# Patient Record
Sex: FEMALE | Race: WHITE | URBAN - METROPOLITAN AREA
[De-identification: names, ages, dates, MRNs, and addresses within clinical notes are randomized per-mention and may not be internally consistent; named-entity substitution may affect disease eponyms.]

---

## 2023-07-25 ENCOUNTER — WEB ENCOUNTER (OUTPATIENT)
Dept: URBAN - METROPOLITAN AREA CLINIC 74 | Facility: CLINIC | Age: 68
End: 2023-07-25

## 2023-07-31 ENCOUNTER — LAB OUTSIDE AN ENCOUNTER (OUTPATIENT)
Dept: URBAN - METROPOLITAN AREA CLINIC 74 | Facility: CLINIC | Age: 68
End: 2023-07-31

## 2023-07-31 ENCOUNTER — OFFICE VISIT (OUTPATIENT)
Dept: URBAN - METROPOLITAN AREA CLINIC 74 | Facility: CLINIC | Age: 68
End: 2023-07-31
Payer: MEDICARE

## 2023-07-31 VITALS
TEMPERATURE: 97.2 F | BODY MASS INDEX: 53.7 KG/M2 | WEIGHT: 255.8 LBS | HEART RATE: 84 BPM | SYSTOLIC BLOOD PRESSURE: 152 MMHG | HEIGHT: 58 IN | DIASTOLIC BLOOD PRESSURE: 80 MMHG

## 2023-07-31 DIAGNOSIS — E66.01 MORBID (SEVERE) OBESITY DUE TO EXCESS CALORIES: ICD-10-CM

## 2023-07-31 DIAGNOSIS — R19.7 BLOODY DIARRHEA: ICD-10-CM

## 2023-07-31 DIAGNOSIS — Q76.1 SHORT NECK SYNDROME: ICD-10-CM

## 2023-07-31 DIAGNOSIS — K92.1 HEMATOCHEZIA: ICD-10-CM

## 2023-07-31 DIAGNOSIS — R10.9 ABDOMINAL CRAMPING: ICD-10-CM

## 2023-07-31 PROBLEM — 83911000119104: Status: ACTIVE | Noted: 2023-07-31

## 2023-07-31 PROBLEM — 408512008: Status: ACTIVE | Noted: 2023-07-31

## 2023-07-31 PROBLEM — 95427009: Status: ACTIVE | Noted: 2023-07-31

## 2023-07-31 PROCEDURE — 99203 OFFICE O/P NEW LOW 30 MIN: CPT | Performed by: PHYSICIAN ASSISTANT

## 2023-07-31 RX ORDER — ASPIRIN 81 MG/1
1 TABLET TABLET, COATED ORAL ONCE A DAY
Status: ACTIVE | COMMUNITY

## 2023-07-31 RX ORDER — ORAL SEMAGLUTIDE 3 MG/1
AS DIRECTED TABLET ORAL
Status: ACTIVE | COMMUNITY

## 2023-07-31 RX ORDER — POLYETHYLENE GLYCOL 3350 17 G/17G
AS DIRECTED POWDER, FOR SOLUTION ORAL
Qty: 238 G | Refills: 0 | OUTPATIENT
Start: 2023-07-31 | End: 2023-08-01

## 2023-07-31 RX ORDER — LISINOPRIL AND HYDROCHLOROTHIAZIDE 10; 12.5 MG/1; MG/1
1 TABLET TABLET ORAL ONCE A DAY
Status: ACTIVE | COMMUNITY

## 2023-07-31 RX ORDER — FLUTICASONE PROPIONATE AND SALMETEROL 50; 500 UG/1; UG/1
AS DIRECTED POWDER RESPIRATORY (INHALATION)
Status: ACTIVE | COMMUNITY

## 2023-07-31 NOTE — HPI-TODAY'S VISIT:
The patient is 68-year-old female with past medical history as noted below is presenting to our clinic today for evaluation of intestinal infection. Last Colonoscopy at the age of 52 year old. No colon polyps. She has a long history of diarrhea. No previous stool study. She was just treated with Flagyl 500 mg for total of 14 days. She sttaes taht she stopped her Metformin and her diarrhea improved but she continues to be symptoms 7-8 times watery stools per day. Occasional bloody diarrhea. Bloody diarrhea for the past two weeks. She currently on Rybelsus for DM. She is complaining of abdominal cramping, bloody stools, and excessive gas. Recent labs at PCP unremarkable. No recent stool study or imaging.    -- The patient denies dyspepsia, dysphagia, odynophagia, hemoptysis, hematemesis, nausea, vomiting, regurgitation, melena, constipation, abdominal pain, hematochezia, fever, chills, chest pain, SOB, or any other GI complaints today.  -- The patient denies ETOH, Tobacco, and Illicit drug use.  -- The patient is up to date with Flu, Pneumonia, Shingles, and COVID vaccine 3/3. -- Denies NSAID's.

## 2023-07-31 NOTE — PHYSICAL EXAM GASTROINTESTINAL
Abdomen , soft, nontender, nondistended , no guarding or rigidity , no masses palpable , normal bowel sounds , Liver and Spleen , no hepatomegaly present , no hepatosplenomegaly , liver nontender , spleen not palpable, ventral hernia Repatha follow-up. Confirmed 2 patient identifiers - name and . Start date confirmed -18. No side effects or missed doses reported. Dry mouth mentioned, but she does not think it's the Repatha causing it. She was advised to monitor and let me know if it worsens. Patient confirms dosing, no difficulties with administration. She did not hear from the Repatha Ready nurse, so she just did it herself without any issues. He/she confirms no changes to insurance or health and has no further questions at this time.  All questions answered and addressed to patients satisfaction. [facility] will continue to reach out to patient monthly to arrange refills.

## 2023-08-01 PROBLEM — 95545007: Status: ACTIVE | Noted: 2023-08-01

## 2023-08-01 PROBLEM — 449341000124102: Status: ACTIVE | Noted: 2023-08-01

## 2023-08-01 PROBLEM — 247330004: Status: ACTIVE | Noted: 2023-08-01

## 2023-08-02 ENCOUNTER — WEB ENCOUNTER (OUTPATIENT)
Dept: URBAN - METROPOLITAN AREA CLINIC 74 | Facility: CLINIC | Age: 68
End: 2023-08-02

## 2023-08-08 LAB
ADENOVIRUS F 40/41: NOT DETECTED
CALPROTECTIN, STOOL - QDX: (no result)
CAMPYLOBACTER: NOT DETECTED
CLOSTRIDIUM DIFFICILE: NOT DETECTED
ENTAMOEBA HISTOLYTICA: NOT DETECTED
ENTEROAGGREGATIVE E.COLI: NOT DETECTED
ENTEROTOXIGENIC E.COLI: NOT DETECTED
ESCHERICHIA COLI O157: NOT DETECTED
FECAL FAT, QUALITATIVE: (no result)
GIARDIA LAMBLIA: NOT DETECTED
H. PYLORI (EIA) - QDX: NEGATIVE
NOROVIRUS GI/GII: NOT DETECTED
PANCREATICELASTASE ELISA, STOOL: (no result)
ROTAVIRUS A: NOT DETECTED
SALMONELLA SPP.: NOT DETECTED
SHIGA-LIKE TOXIN PRODUCING E.COLI: NOT DETECTED
SHIGELLA SPP. / ENTEROINVASIVE E.COLI: NOT DETECTED
STOOL, WHITE BLOOD CELLS: (no result)
VIBRIO PARAHAEMOLYTICUS: NOT DETECTED
VIBRIO SPP.: NOT DETECTED
YERSINIA ENTEROCOLITICA: NOT DETECTED

## 2023-08-09 ENCOUNTER — WEB ENCOUNTER (OUTPATIENT)
Dept: URBAN - METROPOLITAN AREA CLINIC 74 | Facility: CLINIC | Age: 68
End: 2023-08-09

## 2023-08-18 ENCOUNTER — TELEPHONE ENCOUNTER (OUTPATIENT)
Dept: URBAN - METROPOLITAN AREA CLINIC 74 | Facility: CLINIC | Age: 68
End: 2023-08-18

## 2023-08-24 ENCOUNTER — OFFICE VISIT (OUTPATIENT)
Dept: URBAN - METROPOLITAN AREA SURGERY CENTER 30 | Facility: SURGERY CENTER | Age: 68
End: 2023-08-24

## 2023-09-12 ENCOUNTER — TELEPHONE ENCOUNTER (OUTPATIENT)
Dept: URBAN - METROPOLITAN AREA CLINIC 74 | Facility: CLINIC | Age: 68
End: 2023-09-12

## 2023-09-13 ENCOUNTER — OFFICE VISIT (OUTPATIENT)
Dept: URBAN - METROPOLITAN AREA MEDICAL CENTER 30 | Facility: MEDICAL CENTER | Age: 68
End: 2023-09-13
Payer: MEDICARE

## 2023-09-13 DIAGNOSIS — K63.89 OTHER SPECIFIED DISEASES OF INTESTINE: ICD-10-CM

## 2023-09-13 DIAGNOSIS — K52.3 COLITIS, INDETERMINATE: ICD-10-CM

## 2023-09-13 PROCEDURE — 45380 COLONOSCOPY AND BIOPSY: CPT | Performed by: INTERNAL MEDICINE

## 2023-09-14 ENCOUNTER — TELEPHONE ENCOUNTER (OUTPATIENT)
Dept: URBAN - METROPOLITAN AREA CLINIC 3 | Facility: CLINIC | Age: 68
End: 2023-09-14

## 2023-09-15 ENCOUNTER — OFFICE VISIT (OUTPATIENT)
Dept: URBAN - METROPOLITAN AREA CLINIC 74 | Facility: CLINIC | Age: 68
End: 2023-09-15

## 2023-09-15 ENCOUNTER — WEB ENCOUNTER (OUTPATIENT)
Dept: URBAN - METROPOLITAN AREA CLINIC 74 | Facility: CLINIC | Age: 68
End: 2023-09-15

## 2023-09-15 RX ORDER — ASPIRIN 81 MG/1
1 TABLET TABLET, COATED ORAL ONCE A DAY
Status: ACTIVE | COMMUNITY

## 2023-09-15 RX ORDER — LISINOPRIL AND HYDROCHLOROTHIAZIDE 10; 12.5 MG/1; MG/1
1 TABLET TABLET ORAL ONCE A DAY
Status: ACTIVE | COMMUNITY

## 2023-09-15 RX ORDER — FLUTICASONE PROPIONATE AND SALMETEROL 50; 500 UG/1; UG/1
AS DIRECTED POWDER RESPIRATORY (INHALATION)
Status: ACTIVE | COMMUNITY

## 2023-09-15 RX ORDER — ORAL SEMAGLUTIDE 3 MG/1
AS DIRECTED TABLET ORAL
Status: ACTIVE | COMMUNITY

## 2023-09-15 NOTE — HPI-TODAY'S VISIT:
The patient is 68-year-old female with past medical history as noted below is presenting to our clinic today for follow up appointment. Labs, stool study, and Colonoscopy were ordered last visit but the patient did not complete labs and Colonoscopy. Stool study was normal.    -- The patient denies dyspepsia, dysphagia, odynophagia, hemoptysis, hematemesis, nausea, vomiting, regurgitation, melena, constipation, abdominal pain, hematochezia, fever, chills, chest pain, SOB, or any other GI complaints today.  -- The patient denies ETOH, Tobacco, and Illicit drug use.  -- The patient is up to date with Flu, Pneumonia, Shingles, and COVID vaccine 3/3. -- Denies NSAID's.  Diagnostic studies: -- Stool study on 07/31/2023 GPP, Pancreatic elastase, Fecal fat, WBC's, H. pylori stool antigen, and Calprotectin are normal.

## 2023-09-17 ENCOUNTER — WEB ENCOUNTER (OUTPATIENT)
Dept: URBAN - METROPOLITAN AREA CLINIC 74 | Facility: CLINIC | Age: 68
End: 2023-09-17

## 2023-09-18 ENCOUNTER — TELEPHONE ENCOUNTER (OUTPATIENT)
Dept: URBAN - METROPOLITAN AREA CLINIC 74 | Facility: CLINIC | Age: 68
End: 2023-09-18

## 2023-09-18 PROBLEM — 41364008: Status: ACTIVE | Noted: 2023-09-18

## 2023-09-21 ENCOUNTER — OFFICE VISIT (OUTPATIENT)
Dept: URBAN - METROPOLITAN AREA CLINIC 74 | Facility: CLINIC | Age: 68
End: 2023-09-21

## 2023-09-27 PROBLEM — 64766004: Status: ACTIVE | Noted: 2023-09-27

## 2023-09-28 ENCOUNTER — OFFICE VISIT (OUTPATIENT)
Dept: URBAN - METROPOLITAN AREA CLINIC 74 | Facility: CLINIC | Age: 68
End: 2023-09-28
Payer: MEDICARE

## 2023-09-28 VITALS
TEMPERATURE: 97.3 F | BODY MASS INDEX: 53.02 KG/M2 | WEIGHT: 252.6 LBS | DIASTOLIC BLOOD PRESSURE: 80 MMHG | HEIGHT: 58 IN | HEART RATE: 74 BPM | SYSTOLIC BLOOD PRESSURE: 120 MMHG

## 2023-09-28 DIAGNOSIS — K51.20 ULCERATIVE COLITIS CONFINED TO RECTUM: ICD-10-CM

## 2023-09-28 PROCEDURE — 99213 OFFICE O/P EST LOW 20 MIN: CPT | Performed by: PHYSICIAN ASSISTANT

## 2023-09-28 RX ORDER — ORAL SEMAGLUTIDE 3 MG/1
AS DIRECTED TABLET ORAL
Status: ACTIVE | COMMUNITY

## 2023-09-28 RX ORDER — FLUTICASONE PROPIONATE AND SALMETEROL 50; 500 UG/1; UG/1
AS DIRECTED POWDER RESPIRATORY (INHALATION)
Status: ACTIVE | COMMUNITY

## 2023-09-28 RX ORDER — ASPIRIN 81 MG/1
1 TABLET TABLET, COATED ORAL ONCE A DAY
Status: ON HOLD | COMMUNITY

## 2023-09-28 RX ORDER — LISINOPRIL AND HYDROCHLOROTHIAZIDE 10; 12.5 MG/1; MG/1
1 TABLET TABLET ORAL ONCE A DAY
Status: ON HOLD | COMMUNITY

## 2023-09-28 NOTE — HPI-TODAY'S VISIT:
The patient is 68-year-old female with past medical history as noted below is presenting to our clinic today for follow up appointment. Labs, stool study, and Colonoscopy were ordered last visit but the patient did not complete labs and Colonoscopy. Stool study was normal. The patient was diagnosed with UC.  She was started on Mesalamine 1000 mg enema per rectum at bedtime and Mesalamine 1.2 g 4.8 g daily. She is doing much better and diarrhea has resolved. She has 1-2 bowel movements and no bleeding. She had completed labs at her PCP on Monday.   -- The patient denies dyspepsia, dysphagia, odynophagia, hemoptysis, hematemesis, nausea, vomiting, regurgitation, melena, constipation, abdominal pain, hematochezia, fever, chills, chest pain, SOB, or any other GI complaints today.  -- The patient denies ETOH, Tobacco, and Illicit drug use.  -- The patient is up to date with Flu, Pneumonia, Shingles, and COVID vaccine 3/3. -- Denies NSAID's.  Diagnostic studies: -- Stool study on 07/31/2023 GPP, Pancreatic elastase, Fecal fat, WBC's, H. pylori stool antigen, and Calprotectin are normal.  Procedure: -- Colonoscopy with biopsy on 09/13/2023 by Dr. Platt noted preparation of the colon was fair.  Diverticulosis in the ascending colon, in the descending colon, and in the sigmoid colon.  Erythematous mucosa in the rectum.  Findings are consistent with inflammatory bowel disease.  Inflammation was found from the rectum to the sigmoid colon.  This was severe.  Normal mucosa in the descending colon, at the splenic flexure, in the transverse colon, at the hepatic flexure, and in the ascending colon.  Biopsy of cecum, ascending colon, transverse colon, and descending colon consistent mostly with benign colonic mucosa. One fragment shows focal acute inflammation.  Otherwise intact crypt architecture.  Negative for dysplasia.  Biopsy of sigmoid and rectum with chronic active colitis.  Negative for dysplasia. The most significant changes are seen within the sigmoid and rectum biopsies. These show colonic mucosa with increased lamina propria inflammation consisting of lymphocytes, plasma cells, and eosinophils. There is a lesser component of neutrophils but is still present. Crypt architectural distortion is seen. This may represent inflammatory bowel disease.

## 2023-10-11 ENCOUNTER — OFFICE VISIT (OUTPATIENT)
Dept: URBAN - METROPOLITAN AREA CLINIC 74 | Facility: CLINIC | Age: 68
End: 2023-10-11

## 2023-10-11 RX ORDER — MESALAMINE 1.2 G/1
4 TABLETS WITH A MEAL TABLET, DELAYED RELEASE ORAL ONCE A DAY
Qty: 120 TABLET | Refills: 5 | OUTPATIENT
Start: 2023-09-18 | End: 2024-03-16

## 2023-10-11 RX ORDER — ASPIRIN 81 MG/1
1 TABLET TABLET, COATED ORAL ONCE A DAY
Status: ACTIVE | COMMUNITY

## 2023-10-11 RX ORDER — LISINOPRIL AND HYDROCHLOROTHIAZIDE 10; 12.5 MG/1; MG/1
1 TABLET TABLET ORAL ONCE A DAY
Status: ACTIVE | COMMUNITY

## 2023-10-11 RX ORDER — MESALAMINE 1000 MG/1
1 SUPPOSITORY AT BEDTIME SUPPOSITORY RECTAL ONCE A DAY
Qty: 30 | Refills: 1 | OUTPATIENT
Start: 2023-09-18 | End: 2023-11-16

## 2023-10-11 RX ORDER — ORAL SEMAGLUTIDE 3 MG/1
AS DIRECTED TABLET ORAL
Status: ACTIVE | COMMUNITY

## 2023-10-11 RX ORDER — FLUTICASONE PROPIONATE AND SALMETEROL 50; 500 UG/1; UG/1
AS DIRECTED POWDER RESPIRATORY (INHALATION)
Status: ACTIVE | COMMUNITY

## 2023-10-11 NOTE — HPI-TODAY'S VISIT:
The patient is 68-year-old female with past medical history as noted below known to Dr. Platt is presenting to our clinic to discuss her recent stool studies and Colonoscopy results. Her labs reviewed from PCP noted elevated LFT's. The patient Colonoscopy and biopsy were reviewed on 09/18/2023 with her and Dr. Platt. She was started on Mesalamine 1000 mf enema X 30 days and  Mesalamine 1.2 g 4 tablets daily.   -- The patient denies dyspepsia, dysphagia, odynophagia, hemoptysis, hematemesis, nausea, vomiting, regurgitation, melena, constipation, abdominal pain, hematochezia, fever, chills, chest pain, SOB, or any other GI complaints today.  -- The patient denies ETOH, Tobacco, and Illicit drug use.  -- The patient is up to date with Flu, Pneumonia, Shingles, and COVID vaccine 3/3. -- Denies NSAID's.  Diagnostic studies: -- Stool studies on 08/04/2023 GPP, WBC's, Fecal Fat, Pancreatic elastase, H. pylori, and Calprotectin are normal.  -- Labs on 0/27/2023 CMP with BUN 15, creatinine 1.63, , AST 37, ALT 42, and TB 0.6.  Hemoglobin A1c 6.2.  CBC with WBC 8.5, hemoglobin 14.0, hematocrit 42.5, and platelet 289.  .  Lipid panel with cholesterol 194, triglyceride 104, HDL 80, LDL 94.   Procedure: -- Colonoscopy on 09/13/2023 by Dr. Platt noted preparation of the colon was poor.  Diverticulosis in the descending colon.  Diverticulosis in the sigmoid colon.  Diverticulosis in the ascending colon.  Erythematous mucosa in the rectum.  Findings are consistent with inflammatory bowel disease.  Inflammation was found from the rectum to the sigmoid colon.  Normal mucosa in the descending colon, at the splenic flexure, in the transverse colon, at the hepatic flexure, and in the ascending colon.  Biopsy mostly benign colonic mucosa.  1 fragment shows focal active inflammation.  Otherwise intact crypt architecture.  Negative for dysplasia.  With sigmoid and rectum with chronic active colitis.  No dysplasia.  The most significant changes are seen within the sigmoid and rectum biopsies.  These show colonic mucosa with increased lamina propria inflammation consisting of lymphocytes, plasma cells, and eosinophils.  There is a lesser component of neutrophils but is still present.  Crypt architectural distortion is seen.  This may represent inflammatory bowel disease.

## 2023-11-10 PROBLEM — 197321007: Status: ACTIVE | Noted: 2023-11-10

## 2023-12-07 ENCOUNTER — CLAIMS CREATED FROM THE CLAIM WINDOW (OUTPATIENT)
Dept: URBAN - METROPOLITAN AREA CLINIC 74 | Facility: CLINIC | Age: 68
End: 2023-12-07
Payer: MEDICARE

## 2023-12-07 ENCOUNTER — LAB OUTSIDE AN ENCOUNTER (OUTPATIENT)
Dept: URBAN - METROPOLITAN AREA CLINIC 74 | Facility: CLINIC | Age: 68
End: 2023-12-07

## 2023-12-07 VITALS
BODY MASS INDEX: 52.31 KG/M2 | WEIGHT: 249.2 LBS | HEIGHT: 58 IN | HEART RATE: 77 BPM | TEMPERATURE: 96.9 F | OXYGEN SATURATION: 96 % | DIASTOLIC BLOOD PRESSURE: 84 MMHG | SYSTOLIC BLOOD PRESSURE: 146 MMHG

## 2023-12-07 DIAGNOSIS — R74.01 TRANSAMINITIS: ICD-10-CM

## 2023-12-07 DIAGNOSIS — K76.0 HEPATIC STEATOSIS: ICD-10-CM

## 2023-12-07 DIAGNOSIS — K51.30 ULCERATIVE RECTOSIGMOIDITIS WITHOUT COMPLICATION: ICD-10-CM

## 2023-12-07 PROCEDURE — 99214 OFFICE O/P EST MOD 30 MIN: CPT | Performed by: PHYSICIAN ASSISTANT

## 2023-12-07 RX ORDER — MESALAMINE 1.2 G/1
4 TABLETS WITH A MEAL TABLET, DELAYED RELEASE ORAL ONCE A DAY
Qty: 360 TABLET | Refills: 1
Start: 2023-09-18 | End: 2024-06-04

## 2023-12-07 RX ORDER — ASPIRIN 81 MG/1
1 TABLET TABLET, COATED ORAL ONCE A DAY
Status: ON HOLD | COMMUNITY

## 2023-12-07 RX ORDER — MESALAMINE 1000 MG/1
1 SUPPOSITORY AT BEDTIME SUPPOSITORY RECTAL
Qty: 90 | Refills: 1

## 2023-12-07 RX ORDER — LISINOPRIL AND HYDROCHLOROTHIAZIDE 10; 12.5 MG/1; MG/1
1 TABLET TABLET ORAL ONCE A DAY
Status: ACTIVE | COMMUNITY

## 2023-12-07 RX ORDER — FLUTICASONE PROPIONATE AND SALMETEROL 50; 500 UG/1; UG/1
AS DIRECTED POWDER RESPIRATORY (INHALATION)
Status: ACTIVE | COMMUNITY

## 2023-12-07 RX ORDER — MESALAMINE 1000 MG/1
1 SUPPOSITORY AT BEDTIME SUPPOSITORY RECTAL ONCE A DAY
Status: ACTIVE | COMMUNITY

## 2023-12-07 RX ORDER — ORAL SEMAGLUTIDE 3 MG/1
AS DIRECTED TABLET ORAL
Status: ACTIVE | COMMUNITY

## 2023-12-07 RX ORDER — MESALAMINE 1.2 G/1
4 TABLETS WITH A MEAL TABLET, DELAYED RELEASE ORAL ONCE A DAY
Qty: 120 TABLET | Refills: 5 | Status: ACTIVE | COMMUNITY
Start: 2023-09-18 | End: 2024-03-16

## 2023-12-07 NOTE — HPI-TODAY'S VISIT:
The patient is 68-year-old female with past medical history as noted below known to Dr. Platt is presenting to our clinic for follow up appointment.  She is taking on  Mesalamine 1000 mg enema X 30 days and  Mesalamine 1.2 g 4 tablets daily. She is doing much better with diarrhea and no more rectal bleeding. She is doing much better. No new GI issues today. She needs refill on medications. She is diagnosed with TENISHA and she is going to wear C-PAP.    -- The patient denies dyspepsia, dysphagia, odynophagia, hemoptysis, hematemesis, nausea, vomiting, regurgitation, melena, constipation, dairrhea, abdominal pain, hematochezia, fever, chills, chest pain, SOB, or any other GI complaints today.  -- The patient denies ETOH, Tobacco, and Illicit drug use.  -- The patient is up to date with Flu, Pneumonia, Shingles, and COVID vaccine. -- Denies NSAID's.  Diagnostic studies: -- Stool studies on 08/04/2023 GPP, WBC's, Fecal Fat, Pancreatic elastase, H. pylori, and Calprotectin are normal.  -- Labs on 04/27/2023 CMP with BUN 15, creatinine 1.63, , AST 37, ALT 42, and TB 0.6.  Hemoglobin A1c 6.2.  CBC with WBC 8.5, hemoglobin 14.0, hematocrit 42.5, and platelet 289.  .  Lipid panel with cholesterol 194, triglyceride 104, HDL 80, LDL 94.   Procedure: -- Colonoscopy on 09/13/2023 by Dr. Platt noted preparation of the colon was poor.  Diverticulosis in the descending colon.  Diverticulosis in the sigmoid colon.  Diverticulosis in the ascending colon.  Erythematous mucosa in the rectum.  Findings are consistent with inflammatory bowel disease.  Inflammation was found from the rectum to the sigmoid colon.  Normal mucosa in the descending colon, at the splenic flexure, in the transverse colon, at the hepatic flexure, and in the ascending colon.  Biopsy mostly benign colonic mucosa.  1 fragment shows focal active inflammation.  Otherwise intact crypt architecture.  Negative for dysplasia.  With sigmoid and rectum with chronic active colitis.  No dysplasia.  The most significant changes are seen within the sigmoid and rectum biopsies.  These show colonic mucosa with increased lamina propria inflammation consisting of lymphocytes, plasma cells, and eosinophils.  There is a lesser component of neutrophils but is still present.  Crypt architectural distortion is seen.  This may represent inflammatory bowel disease.

## 2023-12-15 ENCOUNTER — DASHBOARD ENCOUNTERS (OUTPATIENT)
Age: 68
End: 2023-12-15

## 2023-12-15 LAB
A/G RATIO: 1.3
ABSOLUTE BASOPHILS: 39
ABSOLUTE EOSINOPHILS: 225
ABSOLUTE LYMPHOCYTES: 2764
ABSOLUTE MONOCYTES: 725
ABSOLUTE NEUTROPHILS: 6047
ACTIN (SMOOTH MUSCLE) ANTIBODY (IGG): <20
ALBUMIN: 4
ALKALINE PHOSPHATASE: 99
ALPHA-1-ANTITRYPSIN QN: 138
ALT (SGPT): 20
ANA SCREEN, IFA: NEGATIVE
AST (SGOT): 24
BASOPHILS: 0.4
BILIRUBIN, TOTAL: 0.5
BUN/CREATININE RATIO: (no result)
BUN: 15
CALCIUM: 9.9
CARBON DIOXIDE, TOTAL: 25
CHLORIDE: 100
CREATININE: 0.67
EGFR: 96
EOSINOPHILS: 2.3
GGT: 26
GLOBULIN, TOTAL: 3
GLUCOSE: 104
HBSAG SCREEN: (no result)
HEMATOCRIT: 41
HEMOGLOBIN: 13.7
HEP A AB, IGM: (no result)
HEP B CORE AB, IGM: (no result)
HEPATITIS C ANTIBODY: (no result)
HEREDITARY HEMOCHROMATOSIS DNA MUT: (no result)
LYMPHOCYTES: 28.2
MCH: 30.5
MCHC: 33.4
MCV: 91.3
MITOCHONDRIAL (M2) ANTIBODY: <=20
MONOCYTES: 7.4
MPV: 11.1
NEUTROPHILS: 61.7
PLATELET COUNT: 375
POTASSIUM: 4.2
PROTEIN, TOTAL: 7
RDW: 13.1
RED BLOOD CELL COUNT: 4.49
RHEUMATOID FACTOR: <14
SJOGREN'S ANTIBODY (SS-A): (no result)
SJOGREN'S ANTIBODY (SS-B): (no result)
SODIUM: 136
WHITE BLOOD CELL COUNT: 9.8

## 2024-06-07 ENCOUNTER — OFFICE VISIT (OUTPATIENT)
Dept: URBAN - METROPOLITAN AREA CLINIC 74 | Facility: CLINIC | Age: 69
End: 2024-06-07
Payer: MEDICARE

## 2024-06-07 ENCOUNTER — LAB OUTSIDE AN ENCOUNTER (OUTPATIENT)
Dept: URBAN - METROPOLITAN AREA CLINIC 74 | Facility: CLINIC | Age: 69
End: 2024-06-07

## 2024-06-07 VITALS
SYSTOLIC BLOOD PRESSURE: 124 MMHG | DIASTOLIC BLOOD PRESSURE: 80 MMHG | BODY MASS INDEX: 51.59 KG/M2 | WEIGHT: 245.8 LBS | HEIGHT: 58 IN | HEART RATE: 89 BPM | TEMPERATURE: 98.2 F

## 2024-06-07 DIAGNOSIS — K76.0 HEPATIC STEATOSIS: ICD-10-CM

## 2024-06-07 DIAGNOSIS — K51.30 ULCERATIVE RECTOSIGMOIDITIS WITHOUT COMPLICATION: ICD-10-CM

## 2024-06-07 PROCEDURE — 99214 OFFICE O/P EST MOD 30 MIN: CPT | Performed by: PHYSICIAN ASSISTANT

## 2024-06-07 RX ORDER — LISINOPRIL AND HYDROCHLOROTHIAZIDE 10; 12.5 MG/1; MG/1
1 TABLET TABLET ORAL ONCE A DAY
Status: ACTIVE | COMMUNITY

## 2024-06-07 RX ORDER — MESALAMINE 1000 MG/1
1 SUPPOSITORY AT BEDTIME SUPPOSITORY RECTAL
Qty: 90 | Refills: 1 | Status: ACTIVE | COMMUNITY

## 2024-06-07 RX ORDER — ORAL SEMAGLUTIDE 3 MG/1
AS DIRECTED TABLET ORAL
Status: ACTIVE | COMMUNITY

## 2024-06-07 RX ORDER — MESALAMINE 1000 MG/1
1 SUPPOSITORY AT BEDTIME SUPPOSITORY RECTAL
Qty: 90 | Refills: 1
End: 2024-12-04

## 2024-06-07 RX ORDER — FLUTICASONE PROPIONATE AND SALMETEROL 50; 500 UG/1; UG/1
AS DIRECTED POWDER RESPIRATORY (INHALATION)
Status: ACTIVE | COMMUNITY

## 2024-06-07 RX ORDER — ASPIRIN 81 MG/1
1 TABLET TABLET, COATED ORAL ONCE A DAY
Status: ON HOLD | COMMUNITY

## 2024-06-07 RX ORDER — MESALAMINE 1.2 G/1
4 TABLETS WITH A MEAL TABLET, DELAYED RELEASE ORAL ONCE A DAY
Qty: 360 TABLET | Refills: 1
Start: 2023-09-18 | End: 2024-12-04

## 2024-06-07 RX ORDER — POLYETHYLENE GLYCOL 3350, SODIUM SULFATE, SODIUM CHLORIDE, POTASSIUM CHLORIDE, ASCORBIC ACID, SODIUM ASCORBATE 140-9-5.2G
AS DIRECTED KIT ORAL ONCE
Qty: 1 | Refills: 0 | OUTPATIENT
Start: 2024-06-07 | End: 2024-06-08

## 2024-06-07 NOTE — HPI-TODAY'S VISIT:
The patient is 68-year-old female with past medical history as noted below known to Dr. Platt is presenting to our clinic for follow up appointment. She is taking Mesalamine 1.2 g 4 tablets daily and Mesalamine 1000 mg enema X 30 days as needed. She is doing well and no GI issues today. .    -- The patient denies ETOH, Tobacco, and Illicit drug use.  -- The patient is up to date with Flu, Pneumonia, Shingles, and COVID. -- Denies NSAID's.    Procedure: -- Colonoscopy on 09/13/2023 by Dr. Platt noted preparation of the colon was poor.  Diverticulosis in the descending colon.  Diverticulosis in the sigmoid colon.  Diverticulosis in the ascending colon.  Erythematous mucosa in the rectum.  Findings are consistent with inflammatory bowel disease.  Inflammation was found from the rectum to the sigmoid colon.  Normal mucosa in the descending colon, at the splenic flexure, in the transverse colon, at the hepatic flexure, and in the ascending colon.  Biopsy mostly benign colonic mucosa.  1 fragment shows focal active inflammation.  Otherwise intact crypt architecture.  Negative for dysplasia.  With sigmoid and rectum with chronic active colitis.  No dysplasia.  The most significant changes are seen within the sigmoid and rectum biopsies.  These show colonic mucosa with increased lamina propria inflammation consisting of lymphocytes, plasma cells, and eosinophils.  There is a lesser component of neutrophils but is still present.  Crypt architectural distortion is seen.  This may represent inflammatory bowel disease.

## 2024-06-13 ENCOUNTER — WEB ENCOUNTER (OUTPATIENT)
Dept: URBAN - METROPOLITAN AREA CLINIC 74 | Facility: CLINIC | Age: 69
End: 2024-06-13

## 2024-06-14 ENCOUNTER — ERX REFILL RESPONSE (OUTPATIENT)
Dept: URBAN - METROPOLITAN AREA CLINIC 74 | Facility: CLINIC | Age: 69
End: 2024-06-14

## 2024-06-14 RX ORDER — MESALAMINE 1.2 G/1
4 TABLETS WITH A MEAL TABLET, DELAYED RELEASE ORAL ONCE A DAY
Qty: 360 TABLET | Refills: 1 | OUTPATIENT

## 2024-06-14 RX ORDER — MESALAMINE 1.2 G/1
TAKE 4 TABLETS BY MOUTH EVERY DAY WITH A MEAL TABLET, DELAYED RELEASE ORAL
Qty: 360 TABLET | Refills: 0 | OUTPATIENT

## 2024-06-20 PROBLEM — 119971000119104: Status: ACTIVE | Noted: 2024-06-20

## 2024-06-20 LAB
ABSOLUTE BASOPHILS: 64
ABSOLUTE EOSINOPHILS: 161
ABSOLUTE LYMPHOCYTES: 2761
ABSOLUTE MONOCYTES: 556
ABSOLUTE NEUTROPHILS: 7158
ALBUMIN/GLOBULIN RATIO: 1.2
ALBUMIN: 4.1
ALKALINE PHOSPHATASE: 97
ALT: 17
AMYLASE: 39
AST: 18
BASOPHILS: 0.6
BILIRUBIN, TOTAL: 0.5
BUN/CREATININE RATIO: (no result)
C-REACTIVE PROTEIN, QUANT: 13.2
CALCIUM: 9.4
CARBON DIOXIDE: 26
CHLORIDE: 101
CREATININE: 0.86
EGFR: 74
EOSINOPHILS: 1.5
FIB 4 INDEX: 0.86
FIB 4 INTERPRETATION: (no result)
GLOBULIN: 3.3
GLUCOSE: 99
HEMATOCRIT: 44.7
HEMOGLOBIN: 14.7
LIPASE: 29
LYMPHOCYTES: 25.8
MCH: 30.3
MCHC: 32.9
MCV: 92.2
MONOCYTES: 5.2
MPV: 10.3
NEUTROPHILS: 66.9
PLATELET COUNT: 342
PLATELET COUNT: 342
POTASSIUM: 3.6
PROTEIN, TOTAL: 7.4
RDW: 12.8
RED BLOOD CELL COUNT: 4.85
SODIUM: 138
UREA NITROGEN (BUN): 22
WHITE BLOOD CELL COUNT: 10.7

## 2024-08-01 ENCOUNTER — WEB ENCOUNTER (OUTPATIENT)
Dept: URBAN - METROPOLITAN AREA CLINIC 74 | Facility: CLINIC | Age: 69
End: 2024-08-01

## 2024-08-05 ENCOUNTER — WEB ENCOUNTER (OUTPATIENT)
Dept: URBAN - METROPOLITAN AREA CLINIC 74 | Facility: CLINIC | Age: 69
End: 2024-08-05

## 2024-08-08 ENCOUNTER — WEB ENCOUNTER (OUTPATIENT)
Dept: URBAN - METROPOLITAN AREA CLINIC 74 | Facility: CLINIC | Age: 69
End: 2024-08-08

## 2024-08-09 ENCOUNTER — WEB ENCOUNTER (OUTPATIENT)
Dept: URBAN - METROPOLITAN AREA CLINIC 74 | Facility: CLINIC | Age: 69
End: 2024-08-09

## 2024-08-14 ENCOUNTER — WEB ENCOUNTER (OUTPATIENT)
Dept: URBAN - METROPOLITAN AREA CLINIC 74 | Facility: CLINIC | Age: 69
End: 2024-08-14

## 2024-08-14 ENCOUNTER — OFFICE VISIT (OUTPATIENT)
Dept: URBAN - METROPOLITAN AREA MEDICAL CENTER 30 | Facility: MEDICAL CENTER | Age: 69
End: 2024-08-14
Payer: MEDICARE

## 2024-08-14 DIAGNOSIS — K51.50 ACUTE LEFT-SIDED ULCERATIVE COLITIS: ICD-10-CM

## 2024-08-14 PROCEDURE — 45380 COLONOSCOPY AND BIOPSY: CPT | Performed by: INTERNAL MEDICINE

## 2024-08-30 ENCOUNTER — WEB ENCOUNTER (OUTPATIENT)
Dept: URBAN - METROPOLITAN AREA CLINIC 74 | Facility: CLINIC | Age: 69
End: 2024-08-30

## 2024-09-03 ENCOUNTER — TELEPHONE ENCOUNTER (OUTPATIENT)
Dept: URBAN - METROPOLITAN AREA CLINIC 74 | Facility: CLINIC | Age: 69
End: 2024-09-03

## 2024-09-09 ENCOUNTER — OFFICE VISIT (OUTPATIENT)
Dept: URBAN - METROPOLITAN AREA CLINIC 74 | Facility: CLINIC | Age: 69
End: 2024-09-09
Payer: MEDICARE

## 2024-09-09 VITALS
DIASTOLIC BLOOD PRESSURE: 68 MMHG | HEIGHT: 58 IN | HEART RATE: 71 BPM | BODY MASS INDEX: 50.71 KG/M2 | WEIGHT: 241.6 LBS | TEMPERATURE: 97.9 F | SYSTOLIC BLOOD PRESSURE: 118 MMHG

## 2024-09-09 DIAGNOSIS — E55.9 VITAMIN D DEFICIENCY: ICD-10-CM

## 2024-09-09 DIAGNOSIS — K51.30 ULCERATIVE RECTOSIGMOIDITIS WITHOUT COMPLICATION: ICD-10-CM

## 2024-09-09 DIAGNOSIS — R79.82 ELEVATED C-REACTIVE PROTEIN (CRP): ICD-10-CM

## 2024-09-09 DIAGNOSIS — K76.0 HEPATIC STEATOSIS: ICD-10-CM

## 2024-09-09 PROBLEM — 34713006: Status: ACTIVE | Noted: 2024-09-09

## 2024-09-09 PROCEDURE — 99213 OFFICE O/P EST LOW 20 MIN: CPT | Performed by: PHYSICIAN ASSISTANT

## 2024-09-09 RX ORDER — MESALAMINE 1000 MG/1
1 SUPPOSITORY AT BEDTIME SUPPOSITORY RECTAL
Qty: 90 | Refills: 1 | Status: ACTIVE | COMMUNITY
End: 2024-12-04

## 2024-09-09 RX ORDER — MESALAMINE 1.2 G/1
2 TABLETS WITH A MEAL TABLET, DELAYED RELEASE ORAL ONCE A DAY
Qty: 180 TABLET | Refills: 3

## 2024-09-09 RX ORDER — LISINOPRIL AND HYDROCHLOROTHIAZIDE 10; 12.5 MG/1; MG/1
1 TABLET TABLET ORAL ONCE A DAY
Status: ACTIVE | COMMUNITY

## 2024-09-09 RX ORDER — ORAL SEMAGLUTIDE 3 MG/1
AS DIRECTED TABLET ORAL
Status: ACTIVE | COMMUNITY

## 2024-09-09 RX ORDER — FLUTICASONE PROPIONATE AND SALMETEROL 50; 500 UG/1; UG/1
AS DIRECTED POWDER RESPIRATORY (INHALATION)
Status: ACTIVE | COMMUNITY

## 2024-09-09 RX ORDER — MESALAMINE 1.2 G/1
TAKE 4 TABLETS BY MOUTH EVERY DAY WITH A MEAL TABLET, DELAYED RELEASE ORAL
Qty: 360 TABLET | Refills: 0 | Status: ACTIVE | COMMUNITY

## 2024-09-09 RX ORDER — MESALAMINE 1000 MG/1
1 SUPPOSITORY AT BEDTIME SUPPOSITORY RECTAL
Qty: 90 | Refills: 3

## 2024-09-09 RX ORDER — ASPIRIN 81 MG/1
1 TABLET TABLET, COATED ORAL ONCE A DAY
Status: ON HOLD | COMMUNITY

## 2024-09-09 NOTE — HPI-TODAY'S VISIT:
The patient is 69-year-old female with past medical history as noted below known to Dr. Platt is presenting to our clinic for follow up appointment to shayy her recent Colonoscopy results She is taking Mesalamine 1.2 g 4 tablets daily and Mesalamine 1000 mg enema X 30 days as needed. She is doing well and no GI issues today. No new GI issues today.   Diagnostic studies: -- Labs on 06/20/2024 CBC, CMP, Amylase, Lipase,  and FIB-4 INDEX 0.86 (Low <1.30). CRP 13.2.   Procedures: -- Colonoscopy with biopsy on 08/14/2024 by Dr. Platt noted diverticulosis in sigmoid colon, in the descending colon, and in the ascending colon.  Normal colonic mucosa throughout the entire colon, random biopsy obtained, some of them very followed by small hematoma at the site of biopsy site.  The distal rectum did lacerate upon insufflation, and 3 endoclips were applied closing the laceration.  The entire examined colon is normal.  Biopsy of right colon with no significant abnormality.  Transverse colon with no significant abnormality.  Left colon with no significant abnormality.  Rectum with mildly active chronic proctitis.   -- Colonoscopy on 09/13/2023 by Dr. Platt noted preparation of the colon was poor.  Diverticulosis in the descending colon.  Diverticulosis in the sigmoid colon.  Diverticulosis in the ascending colon.  Erythematous mucosa in the rectum.  Findings are consistent with inflammatory bowel disease.  Inflammation was found from the rectum to the sigmoid colon.  Normal mucosa in the descending colon, at the splenic flexure, in the transverse colon, at the hepatic flexure, and in the ascending colon.  Biopsy mostly benign colonic mucosa.  1 fragment shows focal active inflammation.  Otherwise intact crypt architecture.  Negative for dysplasia.  With sigmoid and rectum with chronic active colitis.  No dysplasia.  The most significant changes are seen within the sigmoid and rectum biopsies.  These show colonic mucosa with increased lamina propria inflammation consisting of lymphocytes, plasma cells, and eosinophils.  There is a lesser component of neutrophils but is still present.  Crypt architectural distortion is seen.  This may represent inflammatory bowel disease.

## 2024-09-13 ENCOUNTER — WEB ENCOUNTER (OUTPATIENT)
Dept: URBAN - METROPOLITAN AREA CLINIC 74 | Facility: CLINIC | Age: 69
End: 2024-09-13

## 2025-02-24 PROBLEM — 10811000202109: Status: ACTIVE | Noted: 2025-02-24

## 2025-03-10 ENCOUNTER — OFFICE VISIT (OUTPATIENT)
Dept: URBAN - METROPOLITAN AREA CLINIC 74 | Facility: CLINIC | Age: 70
End: 2025-03-10
Payer: MEDICARE

## 2025-03-10 VITALS
DIASTOLIC BLOOD PRESSURE: 70 MMHG | SYSTOLIC BLOOD PRESSURE: 122 MMHG | HEIGHT: 57 IN | TEMPERATURE: 97.5 F | WEIGHT: 232.4 LBS | BODY MASS INDEX: 50.14 KG/M2 | HEART RATE: 83 BPM

## 2025-03-10 DIAGNOSIS — R79.82 ELEVATED C-REACTIVE PROTEIN (CRP): ICD-10-CM

## 2025-03-10 DIAGNOSIS — K76.0 HEPATIC STEATOSIS: ICD-10-CM

## 2025-03-10 DIAGNOSIS — E55.9 VITAMIN D DEFICIENCY: ICD-10-CM

## 2025-03-10 DIAGNOSIS — K51.20 ULCERATIVE PROCTITIS WITHOUT COMPLICATION: ICD-10-CM

## 2025-03-10 PROCEDURE — 99214 OFFICE O/P EST MOD 30 MIN: CPT | Performed by: PHYSICIAN ASSISTANT

## 2025-03-10 RX ORDER — ORAL SEMAGLUTIDE 3 MG/1
AS DIRECTED TABLET ORAL
Status: ACTIVE | COMMUNITY

## 2025-03-10 RX ORDER — ASPIRIN 81 MG/1
1 TABLET TABLET, COATED ORAL ONCE A DAY
Status: ON HOLD | COMMUNITY

## 2025-03-10 RX ORDER — MESALAMINE 1000 MG/1
1 SUPPOSITORY AT BEDTIME SUPPOSITORY RECTAL
Qty: 90 | Refills: 1

## 2025-03-10 RX ORDER — FLUTICASONE PROPIONATE AND SALMETEROL 50; 500 UG/1; UG/1
AS DIRECTED POWDER RESPIRATORY (INHALATION)
Status: ACTIVE | COMMUNITY

## 2025-03-10 RX ORDER — MESALAMINE 1000 MG/1
1 SUPPOSITORY AT BEDTIME SUPPOSITORY RECTAL
Qty: 90 | Refills: 3 | Status: ACTIVE | COMMUNITY

## 2025-03-10 RX ORDER — LISINOPRIL AND HYDROCHLOROTHIAZIDE 10; 12.5 MG/1; MG/1
1 TABLET TABLET ORAL ONCE A DAY
Status: ACTIVE | COMMUNITY

## 2025-03-10 RX ORDER — MESALAMINE 1.2 G/1
2 TABLETS WITH A MEAL TABLET, DELAYED RELEASE ORAL ONCE A DAY
Qty: 180 TABLET | Refills: 1

## 2025-03-10 RX ORDER — MESALAMINE 1.2 G/1
2 TABLETS WITH A MEAL TABLET, DELAYED RELEASE ORAL ONCE A DAY
Qty: 180 TABLET | Refills: 3 | Status: ACTIVE | COMMUNITY

## 2025-03-10 NOTE — HPI-TODAY'S VISIT:
The patient is 69-year-old female with past medical history as noted below known to Dr. Platt is presenting to our clinic for follow up appointment. She is taking Mesalamine 1.2 g 4 tablets daily and Mesalamine 1000 mg enema X 30 days as needed. She denies abdominal pain, diarrhea, constipation, nause, vomiting, fever, or chill. She watching her diet and she has lost 12 lbs since last visit.   Diagnostic studies: -- Labs on 06/20/2024 CBC, CMP, Amylase, Lipase,  and FIB-4 INDEX 0.86 (Low <1.30). CRP 13.2.   Procedures: -- Colonoscopy with biopsy on 08/14/2024 by Dr. Platt noted diverticulosis in sigmoid colon, in the descending colon, and in the ascending colon.  Normal colonic mucosa throughout the entire colon, random biopsy obtained, some of them very followed by small hematoma at the site of biopsy site.  The distal rectum did lacerate upon insufflation, and 3 endoclips were applied closing the laceration.  The entire examined colon is normal.  Biopsy of right colon with no significant abnormality.  Transverse colon with no significant abnormality.  Left colon with no significant abnormality.  Rectum with mildly active chronic proctitis.

## 2025-03-19 ENCOUNTER — TELEPHONE ENCOUNTER (OUTPATIENT)
Dept: URBAN - METROPOLITAN AREA CLINIC 74 | Facility: CLINIC | Age: 70
End: 2025-03-19

## 2025-03-19 PROBLEM — 444548001: Status: ACTIVE | Noted: 2025-03-19

## 2025-03-19 LAB
ABSOLUTE BASOPHILS: 51
ABSOLUTE EOSINOPHILS: 179
ABSOLUTE LYMPHOCYTES: 2389
ABSOLUTE MONOCYTES: 544
ABSOLUTE NEUTROPHILS: 5338
ALBUMIN/GLOBULIN RATIO: 1.4
ALBUMIN: 4
ALKALINE PHOSPHATASE: 87
ALT: 15
AMYLASE: 30
AST: 17
BASOPHILS: 0.6
BILIRUBIN, TOTAL: 0.5
BUN/CREATININE RATIO: (no result)
C-REACTIVE PROTEIN, QUANT: 22.8
CALCIUM: 9.7
CARBON DIOXIDE: 27
CHLORIDE: 100
CREATININE: 0.58
EOSINOPHILS: 2.1
GLOBULIN: 2.8
GLUCOSE: 95
HEMATOCRIT: 43.2
HEMOGLOBIN: 14.2
LIPASE: 18
LYMPHOCYTES: 28.1
MCH: 30.9
MCHC: 32.9
MCV: 93.9
MONOCYTES: 6.4
MPV: 10.6
NEUTROPHILS: 62.8
PLATELET COUNT: 366
POTASSIUM: 4.4
PROTEIN, TOTAL: 6.8
RDW: 12.4
RED BLOOD CELL COUNT: 4.6
SODIUM: 139
UREA NITROGEN (BUN): 10
VITAMIN D,25-OH,TOTAL,IA: 60
WHITE BLOOD CELL COUNT: 8.5

## 2025-03-24 ENCOUNTER — WEB ENCOUNTER (OUTPATIENT)
Dept: URBAN - METROPOLITAN AREA CLINIC 74 | Facility: CLINIC | Age: 70
End: 2025-03-24

## 2025-03-31 LAB
CALPROTECTIN, STOOL - QDX: (no result)
STOOL, WHITE BLOOD CELLS: (no result)

## 2025-07-08 NOTE — PHYSICAL EXAM CARDIOVASCULAR:
Future appt scheduled 09/02/2025  Last appt 06/02/2025     no edema, no murmurs, regular rate and rhythm